# Patient Record
Sex: FEMALE | Race: WHITE | NOT HISPANIC OR LATINO | Employment: OTHER | URBAN - METROPOLITAN AREA
[De-identification: names, ages, dates, MRNs, and addresses within clinical notes are randomized per-mention and may not be internally consistent; named-entity substitution may affect disease eponyms.]

---

## 2023-09-28 ENCOUNTER — APPOINTMENT (EMERGENCY)
Dept: CT IMAGING | Facility: HOSPITAL | Age: 73
End: 2023-09-28
Payer: MEDICARE

## 2023-09-28 ENCOUNTER — HOSPITAL ENCOUNTER (EMERGENCY)
Facility: HOSPITAL | Age: 73
Discharge: HOME | End: 2023-09-28
Attending: EMERGENCY MEDICINE
Payer: MEDICARE

## 2023-09-28 ENCOUNTER — OFFICE VISIT (OUTPATIENT)
Dept: URGENT CARE | Facility: CLINIC | Age: 73
End: 2023-09-28
Payer: MEDICARE

## 2023-09-28 ENCOUNTER — APPOINTMENT (EMERGENCY)
Dept: RADIOLOGY | Facility: HOSPITAL | Age: 73
End: 2023-09-28
Payer: MEDICARE

## 2023-09-28 VITALS
OXYGEN SATURATION: 99 % | HEART RATE: 84 BPM | TEMPERATURE: 97.8 F | DIASTOLIC BLOOD PRESSURE: 69 MMHG | RESPIRATION RATE: 18 BRPM | SYSTOLIC BLOOD PRESSURE: 156 MMHG

## 2023-09-28 VITALS
HEART RATE: 79 BPM | HEIGHT: 66 IN | SYSTOLIC BLOOD PRESSURE: 123 MMHG | DIASTOLIC BLOOD PRESSURE: 63 MMHG | TEMPERATURE: 97.3 F | RESPIRATION RATE: 18 BRPM | OXYGEN SATURATION: 96 % | WEIGHT: 161 LBS | BODY MASS INDEX: 25.88 KG/M2

## 2023-09-28 DIAGNOSIS — S09.90XA INJURY OF HEAD, INITIAL ENCOUNTER: Primary | ICD-10-CM

## 2023-09-28 DIAGNOSIS — S63.277A CLOSED DISLOCATION OF INTERPHALANGEAL JOINT OF LEFT LITTLE FINGER: ICD-10-CM

## 2023-09-28 DIAGNOSIS — S00.81XA ABRASION OF FACE, INITIAL ENCOUNTER: ICD-10-CM

## 2023-09-28 DIAGNOSIS — S00.33XA CONTUSION OF NOSE, INITIAL ENCOUNTER: ICD-10-CM
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
## 2023-09-28 DIAGNOSIS — T07.XXXA ABRASION, MULTIPLE SITES: ICD-10-CM

## 2023-09-28 DIAGNOSIS — S02.2XXA CLOSED FRACTURE OF NASAL BONE, INITIAL ENCOUNTER: ICD-10-CM
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
## 2023-09-28 LAB
ABO GROUP BLD: NORMAL
ABO GROUP BLD: NORMAL
ALBUMIN SERPL BCP-MCNC: 4.6 G/DL (ref 3.5–5)
ALP SERPL-CCNC: 90 U/L (ref 34–104)
ALT SERPL W P-5'-P-CCNC: 42 U/L (ref 7–52)
ANION GAP SERPL CALCULATED.3IONS-SCNC: 10 MMOL/L
APTT PPP: 24 SECONDS (ref 23–37)
AST SERPL W P-5'-P-CCNC: 27 U/L (ref 13–39)
BASOPHILS # BLD AUTO: 0.03 THOUSANDS/ÂΜL (ref 0–0.1)
BASOPHILS NFR BLD AUTO: 0 % (ref 0–1)
BILIRUB SERPL-MCNC: 0.71 MG/DL (ref 0.2–1)
BLD GP AB SCN SERPL QL: NEGATIVE
BUN SERPL-MCNC: 13 MG/DL (ref 5–25)
CALCIUM SERPL-MCNC: 9.1 MG/DL (ref 8.4–10.2)
CHLORIDE SERPL-SCNC: 105 MMOL/L (ref 96–108)
CO2 SERPL-SCNC: 25 MMOL/L (ref 21–32)
CREAT SERPL-MCNC: 0.88 MG/DL (ref 0.6–1.3)
EOSINOPHIL # BLD AUTO: 0.07 THOUSAND/ÂΜL (ref 0–0.61)
EOSINOPHIL NFR BLD AUTO: 1 % (ref 0–6)
ERYTHROCYTE [DISTWIDTH] IN BLOOD BY AUTOMATED COUNT: 12.9 % (ref 11.6–15.1)
GFR SERPL CREATININE-BSD FRML MDRD: 65 ML/MIN/1.73SQ M
GLUCOSE SERPL-MCNC: 105 MG/DL (ref 65–140)
HCT VFR BLD AUTO: 44.4 % (ref 34.8–46.1)
HGB BLD-MCNC: 14.3 G/DL (ref 11.5–15.4)
IMM GRANULOCYTES # BLD AUTO: 0.06 THOUSAND/UL (ref 0–0.2)
IMM GRANULOCYTES NFR BLD AUTO: 1 % (ref 0–2)
INR PPP: 0.9 (ref 0.84–1.19)
LYMPHOCYTES # BLD AUTO: 1.19 THOUSANDS/ÂΜL (ref 0.6–4.47)
LYMPHOCYTES NFR BLD AUTO: 14 % (ref 14–44)
MCH RBC QN AUTO: 30.6 PG (ref 26.8–34.3)
MCHC RBC AUTO-ENTMCNC: 32.2 G/DL (ref 31.4–37.4)
MCV RBC AUTO: 95 FL (ref 82–98)
MONOCYTES # BLD AUTO: 0.55 THOUSAND/ÂΜL (ref 0.17–1.22)
MONOCYTES NFR BLD AUTO: 7 % (ref 4–12)
NEUTROPHILS # BLD AUTO: 6.51 THOUSANDS/ÂΜL (ref 1.85–7.62)
NEUTS SEG NFR BLD AUTO: 77 % (ref 43–75)
NRBC BLD AUTO-RTO: 0 /100 WBCS
PLATELET # BLD AUTO: 260 THOUSANDS/UL (ref 149–390)
PMV BLD AUTO: 9.2 FL (ref 8.9–12.7)
POTASSIUM SERPL-SCNC: 3.5 MMOL/L (ref 3.5–5.3)
PROT SERPL-MCNC: 7.6 G/DL (ref 6.4–8.4)
PROTHROMBIN TIME: 12.4 SECONDS (ref 11.6–14.5)
RBC # BLD AUTO: 4.68 MILLION/UL (ref 3.81–5.12)
RH BLD: POSITIVE
RH BLD: POSITIVE
SODIUM SERPL-SCNC: 140 MMOL/L (ref 135–147)
SPECIMEN EXPIRATION DATE: NORMAL
WBC # BLD AUTO: 8.41 THOUSAND/UL (ref 4.31–10.16)

## 2023-09-28 PROCEDURE — 72125 CT NECK SPINE W/O DYE: CPT

## 2023-09-28 PROCEDURE — 73130 X-RAY EXAM OF HAND: CPT

## 2023-09-28 PROCEDURE — 70450 CT HEAD/BRAIN W/O DYE: CPT

## 2023-09-28 PROCEDURE — 86901 BLOOD TYPING SEROLOGIC RH(D): CPT | Performed by: EMERGENCY MEDICINE

## 2023-09-28 PROCEDURE — 90715 TDAP VACCINE 7 YRS/> IM: CPT | Performed by: EMERGENCY MEDICINE

## 2023-09-28 PROCEDURE — 70486 CT MAXILLOFACIAL W/O DYE: CPT

## 2023-09-28 PROCEDURE — 86850 RBC ANTIBODY SCREEN: CPT | Performed by: EMERGENCY MEDICINE

## 2023-09-28 PROCEDURE — 90471 IMMUNIZATION ADMIN: CPT

## 2023-09-28 PROCEDURE — 86900 BLOOD TYPING SEROLOGIC ABO: CPT | Performed by: EMERGENCY MEDICINE

## 2023-09-28 PROCEDURE — 85610 PROTHROMBIN TIME: CPT | Performed by: EMERGENCY MEDICINE

## 2023-09-28 PROCEDURE — 36415 COLL VENOUS BLD VENIPUNCTURE: CPT | Performed by: EMERGENCY MEDICINE

## 2023-09-28 PROCEDURE — 73564 X-RAY EXAM KNEE 4 OR MORE: CPT

## 2023-09-28 PROCEDURE — G0463 HOSPITAL OUTPT CLINIC VISIT: HCPCS | Performed by: PHYSICIAN ASSISTANT

## 2023-09-28 PROCEDURE — 80053 COMPREHEN METABOLIC PANEL: CPT | Performed by: EMERGENCY MEDICINE

## 2023-09-28 PROCEDURE — 85025 COMPLETE CBC W/AUTO DIFF WBC: CPT | Performed by: EMERGENCY MEDICINE

## 2023-09-28 PROCEDURE — 99284 EMERGENCY DEPT VISIT MOD MDM: CPT

## 2023-09-28 PROCEDURE — 99203 OFFICE O/P NEW LOW 30 MIN: CPT | Performed by: PHYSICIAN ASSISTANT

## 2023-09-28 PROCEDURE — 85730 THROMBOPLASTIN TIME PARTIAL: CPT | Performed by: EMERGENCY MEDICINE

## 2023-09-28 RX ORDER — LEVOTHYROXINE SODIUM 0.07 MG/1
TABLET ORAL
COMMUNITY
Start: 2023-09-25

## 2023-09-28 RX ORDER — LIDOCAINE HYDROCHLORIDE 10 MG/ML
5 INJECTION, SOLUTION EPIDURAL; INFILTRATION; INTRACAUDAL; PERINEURAL ONCE
Status: COMPLETED | OUTPATIENT
Start: 2023-09-28 | End: 2023-09-28

## 2023-09-28 RX ORDER — ROSUVASTATIN CALCIUM 5 MG/1
TABLET, COATED ORAL
COMMUNITY
Start: 2023-09-25

## 2023-09-28 RX ADMIN — TETANUS TOXOID, REDUCED DIPHTHERIA TOXOID AND ACELLULAR PERTUSSIS VACCINE, ADSORBED 0.5 ML: 5; 2.5; 8; 8; 2.5 SUSPENSION INTRAMUSCULAR at 16:22

## 2023-09-28 RX ADMIN — LIDOCAINE HYDROCHLORIDE 5 ML: 10 INJECTION, SOLUTION EPIDURAL; INFILTRATION; INTRACAUDAL; PERINEURAL at 16:22

## 2023-09-28 NOTE — PROGRESS NOTES
[facility]        NAME: Giuseppe Medina is a 68 y.o. female  : 1950    MRN: [id number]  DATE: 2023  TIME: 1:08 PM    Assessment and Plan   Injury of head, initial encounter [S09.90XA]  1. Injury of head, initial encounter  Transfer to other facility      2. Contusion of nose, initial encounter  Transfer to other facility      3. Abrasion, multiple sites  Transfer to other facility      4. Fall, initial encounter  Transfer to other facility        Per [place] head CT score further imaging warranted    Patient Instructions     Patient Instructions   Recommend patient be seen and evaluated further at [address] [facility] ED for head injury and fall resulting in contusions and abrasions to multiple sites. Patient placed in C-collar. Patient is refusing transfer via EMS and will be transported by her . All patient's questions answered and is agreeable with this plan. Follow up with PCP in 3-5 days. Proceed to  ER if symptoms worsen. Chief Complaint     Chief Complaint   Patient presents with   • Fall     C/o fall related to "tripping over a curb and falling onto my face". Pt presents with moderate epistaxis. Abrasions observed to face. Onset of event "30 minutes ago". Pt reports ASA 81mg daily regimen, denies other anticoags. Denies LOC. C-Collar applied in triage. Pt complaining of pain primarily to left small finger and left knee. Denies numbness/tingling. Denies n/v. Unknown TDAP. History of Present Illness       Patient is a 75-year-old female presenting today for evaluation of fall approximately 1 hour ago. Patient notes while walking around the train station in [facility] she tripped over a small curb resulting in her losing her balance, notes that she fell forward, believes she attempted to catch herself but hit her face against the pavement and sustained abrasions to her nose, forehead, arms and left knee.   Was recommended to come to urgent care for evaluation. States she initially started with a significant nosebleed that has since slowed down/resolved. Fall was witnessed by her . Denies LOC. Does take a daily low-dose aspirin. Denies lightheadedness, dizziness, nausea, vomiting, weakness, gait abnormality. Review of Systems   Review of Systems   Constitutional: Negative for chills and fever. HENT: Positive for nosebleeds. Eyes: Negative for pain and visual disturbance. Respiratory: Negative for cough and shortness of breath. Cardiovascular: Negative for chest pain and palpitations. Gastrointestinal: Negative for abdominal pain and vomiting. Genitourinary: Negative. Musculoskeletal:        See HPI   Skin:        See HPI   Neurological: Negative. Negative for dizziness, seizures, syncope, facial asymmetry, weakness, light-headedness and headaches. All other systems reviewed and are negative. Current Medications       Current Outpatient Medications:   •  aspirin (ECOTRIN LOW STRENGTH) 81 mg EC tablet, Take 81 mg by mouth daily, Disp: , Rfl:   •  levothyroxine 75 mcg tablet, , Disp: , Rfl:   •  rosuvastatin (CRESTOR) 5 mg tablet, , Disp: , Rfl:     Current Allergies     Allergies as of 09/28/2023 - Reviewed 09/28/2023   Allergen Reaction Noted   • Statins Myalgia 07/05/2023            The following portions of the patient's history were reviewed and updated as appropriate: allergies, current medications, past family history, past medical history, past social history, past surgical history and problem list.     History reviewed. No pertinent past medical history. History reviewed. No pertinent surgical history. History reviewed. No pertinent family history. Medications have been verified.         Objective   /69 (BP Location: Right arm, Patient Position: Sitting)   Pulse 84   Temp 97.8 °F (36.6 °C) (Temporal)   Resp 18   SpO2 99%        Physical Exam     Physical Exam  Vitals and nursing note reviewed. Constitutional:       General: She is not in acute distress. Appearance: Normal appearance. HENT:      Head:        Right Ear: Tympanic membrane, ear canal and external ear normal.      Left Ear: Tympanic membrane, ear canal and external ear normal.      Ears:      Comments: No hemotympanum     Nose:      Comments: No obvious deformity of nose, no septal deviation, no septal hematoma, no active bleeding     Mouth/Throat:      Mouth: Mucous membranes are moist.      Pharynx: Oropharynx is clear. Eyes:      Extraocular Movements: Extraocular movements intact. Pupils: Pupils are equal, round, and reactive to light. Neck:      Comments: Patient in c-collar  Cardiovascular:      Rate and Rhythm: Normal rate and regular rhythm. Pulses: Normal pulses. Heart sounds: Normal heart sounds. Pulmonary:      Effort: Pulmonary effort is normal.      Breath sounds: Normal breath sounds. Skin:     Capillary Refill: Capillary refill takes less than 2 seconds. Comments: Several small superficial abrasions of dorsal aspect of left and right forearm as well as left knee, full ROM of upper and lower extremities with no discernible discomfort   Neurological:      General: No focal deficit present. Mental Status: She is alert and oriented to person, place, and time. Cranial Nerves: No cranial nerve deficit.

## 2023-09-28 NOTE — PATIENT INSTRUCTIONS
Recommend patient be seen and evaluated further at [address] [facility] ED for head injury and fall resulting in contusions and abrasions to multiple sites. Patient placed in C-collar. Patient is refusing transfer via EMS and will be transported by her . All patient's questions answered and is agreeable with this plan.

## 2023-09-28 NOTE — DISCHARGE INSTRUCTIONS
PLEASE FOLLOW UP WITH YOUR ORTHOPEDIC PROVIDER AND ENT PROVIDER IN YOUR HOME TOWN FOR FURTHER MANAGEMENT.

## 2023-09-28 NOTE — ED PROVIDER NOTES
History  Chief Complaint   Patient presents with   • Krissy Tim onto pavement with head strike. On Asa     HPI    Is a very pleasant, nontoxic-appearing, 59-year-old female presents emergency department with her  status post mechanical fall tripped over a small ledge less than 1 inch high at the local train center in [place] or consequences, mechanical fall, no prodrome prior to fall is on aspirin, went to a local urgent care center for further evaluation. Patient is from out [place] the [place] lives near [place]. Patient is complaining of facial pain pacifically her nose where her nose was bleeding out of both nostrils x10 minutes arrives here with a c-collar that was put in place by the urgent care and a nose clamp for the nosebleed. Patient is also complaining of left hand pain. She is right-hand dominant. Prior to Admission Medications   Prescriptions Last Dose Informant Patient Reported? Taking?   aspirin (ECOTRIN LOW STRENGTH) 81 mg EC tablet   Yes No   Sig: Take 81 mg by mouth daily   levothyroxine 75 mcg tablet   Yes No   rosuvastatin (CRESTOR) 5 mg tablet   Yes No      Facility-Administered Medications: None       Past Medical History:   Diagnosis Date   • Disease of thyroid gland        History reviewed. No pertinent surgical history. History reviewed. No pertinent family history. I have reviewed and agree with the history as documented. E-Cigarette/Vaping   • E-Cigarette Use Never User      E-Cigarette/Vaping Substances     Social History     Tobacco Use   • Smoking status: Never   • Smokeless tobacco: Never   Vaping Use   • Vaping Use: Never used   Substance Use Topics   • Alcohol use: Not Currently   • Drug use: Not Currently       Review of Systems   Constitutional: Negative. Negative for chills and fatigue. HENT: Negative. Negative for dental problem, sinus pressure, tinnitus and trouble swallowing. Eyes: Negative. Respiratory: Negative. Cardiovascular: Negative.     Gastrointestinal: Negative. Negative for abdominal pain. Endocrine: Negative. Genitourinary: Negative. Musculoskeletal: Negative. Allergic/Immunologic: Negative. Neurological:        Facial pain, bilateral nasal bridge pain. Hematological: Negative. Psychiatric/Behavioral: Negative. Physical Exam  Physical Exam  Vitals and nursing note reviewed. Constitutional:       General: She is not in acute distress. Appearance: Normal appearance. She is normal weight. She is not ill-appearing, toxic-appearing or diaphoretic. HENT:      Head: Normocephalic. Comments: AIRWAY: Patient maintaining airway maintaining secretions. No stridor. No brawniness under the tongue. Uvula midline without edema. Phonation normal, trachea midline, no trismus, no tenderness along the sternocleidomastoid muscle group, patient is appropriately masked. No tenderness along the platysmas muscle group, no injuries noted in the zone 1, 2, 3 of the neck. Abrasion noted on bridge of nose, no pain with tracheal movement. No bleeding posterior pharynx. No altered sensation over supraorbital, infraorbital, submental, or inferior alveolar nerve distributions. EOM intact, no evidence of ocular palsy testing, no need for forced duction testing to occur, no pain upon palpation of the medial / lateal buttress w/in oral cavity bilaterally, nasal exam reveals no septal hematomas bilaterally. No [place] instability. Examination of the patient from a vertical posterior perspective does not reveal any zygoma asymmetry or evidence of enophthalmos. Right Ear: External ear normal.      Left Ear: External ear normal.      Nose: Nose normal.      Mouth/Throat:      Mouth: Mucous membranes are moist.      Pharynx: Oropharynx is clear. Eyes:      Extraocular Movements: Extraocular movements intact. Conjunctiva/sclera: Conjunctivae normal.      Pupils: Pupils are equal, round, and reactive to light.    Cardiovascular:      Rate and Rhythm: Normal rate and regular rhythm. Pulses: Normal pulses. Heart sounds: Normal heart sounds. Comments: CARDIOVASCULAR / CIRCULATORY: Peripheral pulses intact in the upper and lower extremities. Pulmonary:      Effort: Pulmonary effort is normal.      Breath sounds: Normal breath sounds. Comments: BREATHING:  No flail segments noted on exam, equal breath sounds in the posterior and anterior lung fields, no tenderness upon palpation of the anterior and posterior ribcage's / thoracic chest wall. No bruising, no contusions, no outward signs of trauma or swelling noted. Abdominal:      General: Abdomen is flat. Bowel sounds are normal.   Musculoskeletal:         General: Swelling, tenderness and signs of injury present. Hands:       Cervical back: Normal range of motion. Skin:     General: Skin is warm. Capillary Refill: Capillary refill takes less than 2 seconds. Neurological:      General: No focal deficit present. Mental Status: She is alert and oriented to person, place, and time. Mental status is at baseline. Psychiatric:         Mood and Affect: Mood normal.         Behavior: Behavior normal.         Thought Content:  Thought content normal.         Judgment: Judgment normal.         Vital Signs  ED Triage Vitals   Temperature Pulse Respirations Blood Pressure SpO2   09/28/23 1339 09/28/23 1339 09/28/23 1339 09/28/23 1339 09/28/23 1339   (!) 97.3 °F (36.3 °C) 86 16 (!) 180/77 99 %      Temp Source Heart Rate Source Patient Position - Orthostatic VS BP Location FiO2 (%)   09/28/23 1339 09/28/23 1339 09/28/23 1341 09/28/23 1339 --   Temporal Monitor Sitting Left arm       Pain Score       09/28/23 1339       3           Vitals:    09/28/23 1541 09/28/23 1630 09/28/23 1641 09/28/23 1645   BP: 124/63 127/68 123/63 123/63   Pulse: 82 83 79 79   Patient Position - Orthostatic VS:             Visual Acuity  Visual Acuity    Flowsheet Row Most Recent Value   L Pupil Size (mm) 3   R Pupil Size (mm) 3          ED Medications  Medications   tetanus-diphtheria-acellular pertussis (BOOSTRIX) IM injection 0.5 mL (0.5 mL Intramuscular Given 9/28/23 1622)   lidocaine (PF) (XYLOCAINE-MPF) 1 % injection 5 mL (5 mL Infiltration Given by Other 9/28/23 1622)       Diagnostic Studies  Results Reviewed     Procedure Component Value Units Date/Time    Comprehensive metabolic panel [[id number]] Collected: 09/28/23 1354    Lab Status: Final result Specimen: Blood from Arm, Right Updated: 09/28/23 1422     Sodium 140 mmol/L      Potassium 3.5 mmol/L      Chloride 105 mmol/L      CO2 25 mmol/L      ANION GAP 10 mmol/L      BUN 13 mg/dL      Creatinine 0.88 mg/dL      Glucose 105 mg/dL      Calcium 9.1 mg/dL      AST 27 U/L      ALT 42 U/L      Alkaline Phosphatase 90 U/L      Total Protein 7.6 g/dL      Albumin 4.6 g/dL      Total Bilirubin 0.71 mg/dL      eGFR 65 ml/min/1.73sq m     Narrative:      [place] guidelines for Chronic Kidney Disease (CKD):   •  Stage 1 with normal or high GFR (GFR > 90 mL/min/1.73 square meters)  •  Stage 2 Mild CKD (GFR = 60-89 mL/min/1.73 square meters)  •  Stage 3A Moderate CKD (GFR = 45-59 mL/min/1.73 square meters)  •  Stage 3B Moderate CKD (GFR = 30-44 mL/min/1.73 square meters)  •  Stage 4 Severe CKD (GFR = 15-29 mL/min/1.73 square meters)  •  Stage 5 End Stage CKD (GFR <15 mL/min/1.73 square meters)  Note: GFR calculation is accurate only with a steady state creatinine    Protime-INR [[id number]]  (Normal) Collected: 09/28/23 1354    Lab Status: Final result Specimen: Blood from Arm, Right Updated: 09/28/23 1413     Protime 12.4 seconds      INR 0.90    APTT [[id number]]  (Normal) Collected: 09/28/23 1354    Lab Status: Final result Specimen: Blood from Arm, Right Updated: 09/28/23 1413     PTT 24 seconds     CBC and differential [[id number]]  (Abnormal) Collected: 09/28/23 1354    Lab Status: Final result Specimen: Blood from Arm, Right Updated: 09/28/23 1407     WBC 8.41 Thousand/uL      RBC 4.68 Million/uL      Hemoglobin 14.3 g/dL      Hematocrit 44.4 %      MCV 95 fL      MCH 30.6 pg      MCHC 32.2 g/dL      RDW 12.9 %      MPV 9.2 fL      Platelets 251 Thousands/uL      nRBC 0 /100 WBCs      Neutrophils Relative 77 %      Immat GRANS % 1 %      Lymphocytes Relative 14 %      Monocytes Relative 7 %      Eosinophils Relative 1 %      Basophils Relative 0 %      Neutrophils Absolute 6.51 Thousands/µL      Immature Grans Absolute 0.06 Thousand/uL      Lymphocytes Absolute 1.19 Thousands/µL      Monocytes Absolute 0.55 Thousand/µL      Eosinophils Absolute 0.07 Thousand/µL      Basophils Absolute 0.03 Thousands/µL                  XR hand 3+ views LEFT   Final Result by Camden Blount MD (09/28 1704)      Status post reduction of the previously described left fifth proximal interphalangeal joint dislocation. Workstation performed: [id number]         XR hand 3+ views LEFT   ED Interpretation by Joseph Massey III, DO (09/28 1520)   Dislocation of the left fifth MCP joint      Final Result by Camden Blount MD (09/28 1528)      Left fifth proximal interphalangeal joint dislocation. Workstation performed: [id number]         XR knee 4+ views left injury   ED Interpretation by Joseph Massey III, DO (09/28 1519)   4 view x-ray of the knee shows no acute fractures or injuries. Final Result by Camden Blount MD (09/28 1529)      No acute osseous abnormality. Workstation performed: [id number]         TRAUMA - CT spine cervical wo contrast   Final Result by Senia Lewis MD (09/28 1428)      No cervical spine fracture or traumatic malalignment. Workstation performed: [id number]         TRAUMA - CT facial bones wo contrast   Final Result by Senia Lewis MD (09/28 1426)      Mildly depressed nasal bone fracture.                Workstation performed: [id number]         TRAUMA - CT head wo contrast   Final Result by Diana Romero MD (09/28 2025)      No acute intracranial abnormality. Mildly depressed nasal bone fracture. Workstation performed: [id number]                    Procedures  Orthopedic injury treatment    Date/Time: 9/28/2023 6:50 PM    Performed by: Patricia Chapa III, DO  Authorized by: Toño Mejia DO  Universal Protocol:  Timeout called at: 9/28/2023 6:50 PM.  Patient identity confirmed: verbally with patient      Injury location:  Finger  Location details:  Left little finger  Injury type:  Dislocation  Dislocation type: IP    Neurovascular status: Neurovascularly intact    Distal perfusion: normal    Neurological function: normal    Range of motion: reduced    Local anesthesia used?: Yes    Anesthesia:  Local infiltration and digital block  Local anesthetic:  Lidocaine 1% without epinephrine  Anesthetic total (ml):  4  Sedation type: Moderate (conscious) sedation (See separate Procedural Sedation form)  Immobilization:  Splint  Splint type:  Finger splint, static  Supplies used:  Aluminum splint  Neurovascular status: Neurovascularly intact    Distal perfusion: normal    Neurological function: normal    Range of motion: normal    Patient tolerance:  Patient tolerated the procedure well with no immediate complications             ED Course  ED Course as of 09/28/23 1820   Thu Sep 28, 2023   1343 Trauma evaluation called. Patient seen and examined, PELVIS STABLE, no acute injury in chest, O2 sat: 99 %. 1536 - Patient is able to range neck to greater than 45 degrees to LEFT and RIGHT. Patient is able to touch chin to chest and hyper-extend without eliciting pain. Patient has no midline tenderness.  5/5. No numbness/tingling in the arms. 2+ radials. No carotid bruit. Palpable carotid pulses that are equal.     Patient reassessed: no septal hematomas noted, no active bleedings.    1620 Upon tertiary trauma assessment / reassessment: Was physically examined, no other injuries or concerns for attic mechanism, patient appears to be in no acute distress, lungs are clear to auscultation. Medical Decision Making  Mechanical fall, stable for discharge, please see ED course for specifics, no clinical indication for FAST scan: no chest trauma, stat's stable > 97 %, no resp complaints, IP dislocation on L 5th digit, reduced at bedside, stable for d/c. Portions of the record may have been created with voice recognition software. Occasional wrong word or "sound a like" substitutions may have occurred due to the inherent limitations of voice recognition software. Read the chart carefully and recognize, using context, where substitutions have occurred. Counseling: I had a detailed discussion with the patient and/or guardian regarding: the historical points, exam findings, and any diagnostic results supporting the discharge diagnosis, lab results, radiology results, discharge instructions reviewed with patient and/or family/caregiver and understanding was verbalized. Instructions given to return to the emergency department if symptoms worsen or persist, or if there are any questions or concerns that arise at home.     Amount and/or Complexity of Data Reviewed  Labs: ordered. Radiology: ordered and independent interpretation performed. Risk  Prescription drug management. Disposition  Final diagnoses:   Fall, initial encounter   Closed fracture of nasal bone, initial encounter   Abrasion of face, initial encounter   Closed dislocation of interphalangeal joint of left little finger     Time reflects when diagnosis was documented in both MDM as applicable and the Disposition within this note     Time User Action Codes Description Comment    9/28/2023  5:04 PM Viktoria Houston Add [P03. NQCV] Fall, initial encounter     9/28/2023  5:09 PM Viktoria Goji Add [S02. 2XXA] Closed fracture of nasal bone, initial encounter     9/28/2023  5:09 PM Jason Carmona Add [S00.81XA] Abrasion of face, initial encounter     9/28/2023  5:10 PM Jason Carmona Add [A86.237X] Closed dislocation of interphalangeal joint of left little finger       ED Disposition     ED Disposition   Discharge    Condition   Stable    Date/Time   Thu Sep 28, 2023  5:04 PM    Madison Booker March discharge to home/self care. Follow-up Information    None         Discharge Medication List as of 9/28/2023  5:12 PM      CONTINUE these medications which have NOT CHANGED    Details   aspirin (ECOTRIN LOW STRENGTH) 81 mg EC tablet Take 81 mg by mouth daily, Historical Med      levothyroxine 75 mcg tablet Starting Mon 9/25/2023, Historical Med      rosuvastatin (CRESTOR) 5 mg tablet Starting Mon 9/25/2023, Historical Med             No discharge procedures on file.     PDMP Review     None          ED Provider  Electronically Signed by           Dakota Barnes III, DO  09/28/23 6962